# Patient Record
(demographics unavailable — no encounter records)

---

## 2025-03-18 NOTE — RISK ASSESSMENT
Outpatient Medical Nutrition Therapy Note    The patient was seen for Class II Obesity in an outpatient setting. This visit is being performed virtually via Telephonic Visit. Consent to treat includes permission to submit charges to the patient's insurance. It was shared that without being seen and evaluated in person, there is a risk that the information and/or assessment may be incomplete or inaccurate. This telephonic visit may be discontinued by patient or clinician, if it is felt that the telephonic connections are not adequate for her situation.   Clinical Location: Home  Sonia's location Home and is physically present in the [facility] at the time of this visit.     Spoke with patient to review HMR plan. She is doing well. She replaced some HMR entrees with a salad with chicken when eating out with friends. She will replace HMR cereal with Islam protein oatmeal to see if she likes it any better. Continue to follow HMR plan for now: 3 shakes (including 1 hot cereal/oatmeal), 2 entrees, and 5 servings of fruits/veggies daily. Reviewed that we will work towards replacing HMR foods with similar caloric grocery store foods as she transitions off of HMR in a few weeks. She is motivated to continue at this time. Reviewed that HMR bars are to keep her on track but she should not be using them as shakes/meals, just as extras, as needed to keep her on track.     Home weight reported today: 203 lbs.     Recommended Follow-up:  Follow-up appointment: 9/4/24 @ 1:30 pm  The patient was encouraged to e-mail if any questions or concerns.    Patient was seen for 30 minutes    Thank you for your referral.  Please contact me with any question/concerns.    Cori Doshi RDN [facility]  Diabetes & Nutrition Care Center Coordinator  [facility] [address] [place] Enrique  P [phone]   F [phone]  [email or web address]   [PHQ-9 Negative - No further assessment needed] : PHQ-9 Negative - No further assessment needed [Have you ever fainted, passed out or had an unexplained seizure suddenly and without warning, especially during exercise or in response] : Have you ever fainted, passed out or had an unexplained seizure suddenly and without warning, especially during exercise or in response to sudden loud noises such as doorbells, alarm clocks and ringing telephones? No [Have you ever had exercise-related chest pain or shortness of breath?] : Have you ever had exercise-related chest pain or shortness of breath? No [Has anyone in your immediate family (parents, grandparents, siblings) or other more distant relatives (aunts, uncles, cousins)  of heart] : Has anyone in your immediate family (parents, grandparents, siblings) or other more distant relatives (aunts, uncles, cousins)  of heart problems or had an unexpected sudden death before age 50 (This would include unexpected drownings, unexplained car accidents in which the relative was driving or sudden infant death syndrome.)? No [Are you related to anyone with hypertrophic cardiomyopathy or hypertrophic obstructive cardiomyopathy, Marfan syndrome, arrhythmogenic] : Are you related to anyone with hypertrophic cardiomyopathy or hypertrophic obstructive cardiomyopathy, Marfan syndrome, arrhythmogenic right ventricular cardiomyopathy, long QT syndrome, short QT syndrome, Brugada syndrome or catecholaminergic polymorphic ventricular tachycardia, or anyone younger than 50 years with a pacemaker or implantable defibrillator? No [No Increased risk of SCA or SCD] : No Increased risk of SCA or SCD

## 2025-03-18 NOTE — PHYSICAL EXAM

## 2025-03-18 NOTE — DISCUSSION/SUMMARY
[Normal Growth] : growth [Normal Development] : development  [No Elimination Concerns] : elimination [Continue Regimen] : feeding [No Skin Concerns] : skin [Normal Sleep Pattern] : sleep [None] : no medical problems [Anticipatory Guidance Given] : Anticipatory guidance addressed as per the history of present illness section [Physical Growth and Development] : physical growth and development [Social and Academic Competence] : social and academic competence [Emotional Well-Being] : emotional well-being [Risk Reduction] : risk reduction [Violence and Injury Prevention] : violence and injury prevention [No Vaccines] : no vaccines needed [No Medications] : ~He/She~ is not on any medications [Patient] : patient [Parent/Guardian] : Parent/Guardian [] : The components of the vaccine(s) to be administered today are listed in the plan of care. The disease(s) for which the vaccine(s) are intended to prevent and the risks have been discussed with the caretaker.  The risks are also included in the appropriate vaccination information statements which have been provided to the patient's caregiver.  The caregiver has given consent to vaccinate. [FreeTextEntry1] : Continue balanced diet with all food groups. Brush teeth twice a day with toothbrush. Recommend visit to dentist. Maintain consistent daily routines and sleep schedule. Personal hygiene, puberty, and sexual health reviewed. Risky behaviors assessed. School discussed. Limit screen time to no more than 2 hours per day. Encourage physical activity. Cardiac questionnaire reviewed, NO issues. 5-2-1-0 questionnaire reviewed and I discussed components of 5-2-1-0 healthy living with patient and family.  Recommended 5 servings of fruits and vegetables a day, less than 2 hours of screen time per day, 1 hour of exercise per day and zero sugar sweetened beverages. Parent(s) have no issues or concerns. Discussed in the preferred language of English Return 1 year for routine well child check.

## 2025-03-18 NOTE — HISTORY OF PRESENT ILLNESS
[Father] : father [FreeTextEntry7] : 16 y well  [FreeTextEntry1] : Patient is doing well - has no concerns or issues. Parent(s) have no current concerns or issues. Menses are regular now and does not see GYN, no meds No change in health status since last [facility] No chest pains or difficulty of breathing reported No reactions to previous vaccinations. Denies depression or psychiatric issues. No mental health issues, not in counseling. No suicidal ideations Appetite good, no issues No new allergies reported Not sexually active Denies tobacco, ETOH, drug use or vaping No tobacco exposure Sleeping well with good sleeping patterns No recent severe illness or injury No emergency room visits No trauma to the head /concussion. Current thgthrthathdtheth:th th9th No problems in school identified -  no ADD/ADHD concerns Activities: no sports PHQ9 and CRAFFT reviewed, no issues Coordination of Care reviewed, no issues Cardiac questionnaire reviewed, no issues Has been to the dentist within last 12 months